# Patient Record
Sex: FEMALE | Race: BLACK OR AFRICAN AMERICAN | Employment: OTHER | URBAN - METROPOLITAN AREA
[De-identification: names, ages, dates, MRNs, and addresses within clinical notes are randomized per-mention and may not be internally consistent; named-entity substitution may affect disease eponyms.]

---

## 2018-03-05 ENCOUNTER — APPOINTMENT (OUTPATIENT)
Dept: PHYSICAL THERAPY | Age: 63
End: 2018-03-05
Payer: OTHER GOVERNMENT

## 2018-03-15 ENCOUNTER — HOSPITAL ENCOUNTER (OUTPATIENT)
Dept: PHYSICAL THERAPY | Age: 63
Discharge: HOME | End: 2018-03-15
Payer: OTHER GOVERNMENT

## 2018-03-15 PROCEDURE — 97110 THERAPEUTIC EXERCISES: CPT

## 2018-03-15 PROCEDURE — 97162 PT EVAL MOD COMPLEX 30 MIN: CPT

## 2018-03-15 PROCEDURE — 97140 MANUAL THERAPY 1/> REGIONS: CPT

## 2018-03-15 NOTE — PROGRESS NOTES
[address] [facility] AT [address], [address] [place] [address] Road  Phone: [phone]   [phone]  PLAN OF CARE / [address] [place] [address] PHYSICAL THERAPY SERVICES  Patient Name: Justin Steiner : 1955   Medical   Diagnosis: Right shoulder pain [M25.511] Treatment Diagnosis: Right shoulder pain [M25.511]   Onset Date: 2018     Referral Source: Tristian Renee MD Start of [facility]): 3/15/2018   Prior Hospitalization: See medical history Provider #: [id number]   Prior Level of Function: Independent w/ ADL's   Comorbidities: HTN, prior scapular injury   Medications: Verified on Patient Summary List   The Plan of Care and following information is based on the information from the initial evaluation.   ===========================================================================================  Assessment / key information:  Patient is a 58 y/o female presenting with chief c/o right scapular pain- onset about 6 months ago when turning and forcefully hitting shoulder on wall. Pt reported similar pain years ago which improved with therapy. Pt  Reports pain is aggravated with heavy lifting with right upper extremity and \"turning wrong\". 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 Pain ranges from 1-9/10 max briefly. Patient presents with mild/mod rounded shoulders and increased lumbar lordosis. Shoulder AROM is WFL in all planes. Thoracic rotation is painful and limited about 25% in both directions. Patient is TTP to the right mid/low trap and rhomboid muscles, costovertebral joints T5-10. Strength deficits include 4/5 shoulder ER, 4-/5 mid/low trapezius. The patient was instructed in a home exercise program to address the above findings/deficits.  The patient should benefit from therapy services to progress toward functional goals and improve quality of life.  ===========================================================================================  History MEDIUM  Complexity : 1-2 comorbidities / personal factors will impact the outcome/ POC ; Examination MEDIUM Complexity : 3 Standardized tests and measures addressing body structure, function, activity limitation and / or participation in recreation ; Presentation MEDIUM Complexity : Evolving with changing characteristics ; Decision Making MEDIUM Complexity : FOTO score of 26-74; Complexity MEDIUM  Problem List: pain affecting function, decrease ROM, decrease strength, decrease ADL/ functional abilitiies, decrease activity tolerance and decrease flexibility/ joint mobility   Treatment Plan may include any combination of the following: Therapeutic exercise, Therapeutic activities, Physical agent/modality, Manual therapy and Patient education  Patient / Family readiness to learn indicated by: asking questions, trying to perform skills and interest  Persons(s) to be included in education: patient (P)  Barriers to Learning/Limitations: None  Measures taken:    Patient Goal (s): No pain   Patient self reported health status: good  Rehabilitation Potential: good   Short Term Goals: To be accomplished in  3  weeks: Independent/compliant with long term HEP for scapular strength and thoracic mobility  Patient will be educated on sitting posture modification to reduce musculoskeletal strain with sitting ADL's   Long Term Goals:  To be accomplished in  6  weeks:  Improve FOTO outcome score by 10% to indicate a significant improvement in ADL function  Improve mid/low trap strength by 1/2 MMT grade to improve thoracic posture and shoulder function  Pt will report 50-75% improvement with heavier reaching/pushing/pulling tasks  Frequency / Duration:   Patient to be seen  1-2  time per week for 6  weeks:  Patient / Caregiver education and instruction: activity modification and exercises  G-Codes (GP): NA  Therapist Signature: Halima Garvin PT, OCS Date: 8/94/7422   Certification Period: NA Time: 1:16 PM   ===========================================================================================  I certify that the above Physical Therapy Services are being furnished while the patient is under my care. I agree with the treatment plan and certify that this therapy is necessary. Physician Signature:        Date:       Time:     Please sign and return to [facility] at [facility] UNIT or you may fax the signed copy to [phone]. Thank you.

## 2018-03-15 NOTE — PROGRESS NOTES
SHOULDER EVALUATION/ DAILY TREATMENT NOTE    Patient Name: Tran Mujica  Date:3/15/2018  : 1955  [x]  Patient  Verified  Payor:  / Plan: [facility]  RETIREES AND DEPENDENTS / Product Type: Shania Erps /    In time:1:10  Out time:2:00  Total Treatment Time (min): 50  Total Timed Codes (min): 50  1:1 Treatment Time ( [address] only):    Visit #: 1 of     Treatment Area: Right shoulder pain [M25.511]    SUBJECTIVE HISTORY:   See Plan of Care for subjective history    Pain Level (0-10 scale): 1    Posture: See POC    ROM:  [] Unable to assess at this time                                           AROM                                                              PROM   Left Right  Left Right   Flexion   Flexion     Extension   Extension     Abduction   Abduction     ER @ 0 Degrees   ER @ 0 Degrees     ER @ 90 Degrees   ER @ 90 Degrees     IR @ 90 Degrees   IR @ 90 Degrees       End Feel / Painful Arc:    Strength:   [] Unable to assess at this time                                                                            L (1-5) R (1-5) Pain   Flexors   [] Yes   [] No   Abductors   [] Yes   [] No   External Rotators   [] Yes   [] No   Internal Rotators   [] Yes   [] No   Supraspinatus   [] Yes   [] No   Serratus Anterior   [] Yes   [] No   Lower Trapezius   [] Yes   [] No   Elbow Flexion   [] Yes   [] No   Elbow Extension   [] Yes   [] No       Scapulohumoral Control / Rhythm:  Able to eccentrically lower with good control?  Left: [] Yes   [] No     Right: [] Yes   [] No      Palpation:  [] Min  [] Mod  [] Severe    Location:  [] Min  [] Mod  [] Severe    Location:  [] Min  [] Mod  [] Severe    Location:    Special Tests:  Adson's Test  [] Pos   [] Neg Yergason's Test [] Pos   [] Neg  Moraima's Test  [] Pos   [] Neg ER Lag Sign     [] Pos   [] Neg  Neer's Test  [] Pos   [] Neg IR Lift Off Sign  [] Pos   [] Neg  Hawkin's Test  [] Pos   [] Neg AC Joint  [] Pos   [] Neg  Speed's Test  [] Pos   [] Neg SC Joint  [] Pos   [] Neg  Empty Can  [] Pos   [] Neg Pectoral Tightness [] Pos   [] Neg  Anterior Apprehension [] Pos   [] Neg   Posterior Apprehension [] Pos   [] Neg  Other:     OBJECTIVE TREATMENT:  Modality (rationale):   []  E-Stim: type _ x _ min     []att   []unatt   []w/US   []w/ice   []w/heat  []  Ultrasound: []cont   []pulse    _ W/cm2 x _  min   []1MHz   []3MHz  []  Iontophoresis: []take home patch w/ dexamethazone    []40mA   []80mA                               []_ mA min w/: []dexamethazone   []other:_  []  Ice pack _  min     [] Hot pack _  min     [] Paraffin _  min  []  Other:     Therapeutic Exercise: [x] see flow sheet     [] Other:_  Added/Changed Exercises:  [x]  Added:_See HEP  to improve (function): change upper limb position for lifting, reaching and dressing tasks  []  Changed:_ to improve (function):  (minutes: 10)    Therapeutic Activity:   (minutes: )    Manual Therapy: Instrument assisted soft tissue mobilization applied to Right thoracic paraspinals and rhomboids using GT-4 and GT-3 instruments  (minutes: 8)    Other Objective/Functional Measures:   Pain Level (0-10 scale) post treatment: 1    ASSESSMENT  [x]  See Plan of Care  Patient is assigned a medium evaluation complexity based upon presence of 1-2 comorbidities, FOTO score, and changing/progressive symptom characteristics.     PLAN  See [address] Ofelia Madsen 3/15/2018  1:14 PM

## 2018-03-19 ENCOUNTER — HOSPITAL ENCOUNTER (OUTPATIENT)
Dept: PHYSICAL THERAPY | Age: 63
Discharge: HOME | End: 2018-03-19
Payer: OTHER GOVERNMENT

## 2018-03-19 PROCEDURE — 97140 MANUAL THERAPY 1/> REGIONS: CPT

## 2018-03-19 PROCEDURE — 97110 THERAPEUTIC EXERCISES: CPT

## 2018-03-30 ENCOUNTER — APPOINTMENT (OUTPATIENT)
Dept: PHYSICAL THERAPY | Age: 63
End: 2018-03-30
Payer: OTHER GOVERNMENT

## 2018-04-02 ENCOUNTER — HOSPITAL ENCOUNTER (OUTPATIENT)
Dept: PHYSICAL THERAPY | Age: 63
Discharge: HOME | End: 2018-04-02
Payer: OTHER GOVERNMENT

## 2018-04-02 PROCEDURE — 97110 THERAPEUTIC EXERCISES: CPT

## 2018-04-02 PROCEDURE — 97140 MANUAL THERAPY 1/> REGIONS: CPT

## 2018-04-02 NOTE — PROGRESS NOTES
PT DAILY TREATMENT NOTE     Patient Name: Carolee Hodges  Date:2018  : 1955  [x]  Patient  Verified  Payor:  / Plan: [facility] / Product Type:  /    In time: 158  Out time: 247  Total Treatment Time (min): 49  Total Timed Codes (min): 49  1:1 Treatment Time (min): n/a   Visit #: 3 of     Treatment Area: Right shoulder pain [M25.511]    SUBJECTIVE  Pain Level (0-10 scale): 0  Any medication changes, allergies to medications, adverse drug reactions, diagnosis change, or new procedure performed?: [x] No    [] Yes (see summary sheet for update)  Subjective functional status/changes:   [] No changes reported  \"I haven't had any pain for about a week now, but I haven't been doing any heavy stuff\". OBJECTIVE  Modality rationale:    Min Type Additional Details    [] Estim: []Att   []Unatt        []TENS instruct                  []IFC  []Premod   []NMES                     []Other:  []w/US   []w/ice   []w/heat  Position:  Location:    []  Traction: [] Cervical       []Lumbar                       [] Prone          []Supine                       []Intermittent   []Continuous Lbs:  [] before manual  [] after manual    []  Ultrasound: []Continuous   [] Pulsed                           []1MHz   []3MHz Location:  W/cm2:    []  Iontophoresis with dexamethasone         Location: [] Take home patch   [] In clinic   PD []  Ice     []  heat  []  Ice massage Position:  Location:    []  Vasopneumatic Device Pressure:       [] lo [] med [] hi   Temperature: [] lo [] med [] hi   [] Skin assessment post-treatment:  []intact []redness- no adverse reaction       []redness - adverse reaction:       34 min Therapeutic Exercise:  [x] See flow sheet :   Rationale: increase ROM and increase strength to improve the patients ability to lift, reach and carry for ADL's. 15 min Manual Therapy:  DTM b/l t/s paraspinals and periscapular mm's.   Manual scapulo-thoracic joint mobs sup/inf, ant/post, upward/downward ROT R shoulder in L S/L   Rationale: decrease pain, increase ROM, increase tissue extensibility and decrease trigger points to lift, reach and carry for ADL's.       min Patient Education: [x] Review HEP    [] Progressed/Changed HEP based on:   [] positioning   [] body mechanics   [] transfers   [] heat/ice application        Other Objective/Functional Measures:   -manual cues required for form with quadruped t/s ROT  -verbal cues for form with cat stretch, prone scap stability    Pain Level (0-10 scale) post treatment: 0    ASSESSMENT/Changes in Function: Pt with limited thoracic mobility during exercises requiring cues for improved form/technique. Ongoing hypertonicity noted to b/l thoracic paraspinals and periscapular mm's addressed with manual. Progression of exercises indicating to promote return to full work activities. Patient will continue to benefit from skilled PT services to modify and progress therapeutic interventions, address functional mobility deficits, address ROM deficits, address strength deficits, analyze and address soft tissue restrictions and analyze and cue movement patterns to attain remaining goals. []  See Plan of Care  []  See progress note/recertification  []  See Discharge Summary         Progress towards goals / Updated goals:  Goals continue to be in progress. Good progress towards pain goal    PLAN  []  Upgrade activities as tolerated     [x]  Continue plan of care  []  Update interventions per flow sheet       []  Discharge due to:_  []  Other:_      Karla Jurado, PT 4/2/2018  2:02 PM

## 2018-06-18 NOTE — PROGRESS NOTES
[address] [facility] AT [address], [address] [place] [address] Road  Phone: [phone]   [phone]  DISCHARGE SUMMARY  Patient Name: Helena Tyson : 1955   Treatment/Medical Diagnosis: Right shoulder pain [M25.511]   Referral Source: Celine Capps MD     Date of Initial Visit: 3/15/18 Attended Visits: 3 Missed Visits: 1     SUMMARY OF TREATMENT  Therapeutic exercise to right scapular/T-spine region, manual therapy, pain modalities, HEP  CURRENT STATUS  Patient attended only 3 of 6-12 prescribed therapy visits for right scapular pain. Patient reported 0/10 pain on her last visit on 18 and said it had been 1 week since having any pain. Pt did not return after her 3rd visit and has been discharged without our ability to formally assess progress toward LTG's. RECOMMENDATIONS  Discontinue therapy due to lack of attendance or compliance. If you have any questions/comments please contact us directly at [phone]. Thank you for allowing us to assist in the care of your patient.     Therapist Signature: Jovi Fletcher PT, OCS Date: 18     Time: 1:33 PM

## 2021-06-08 ENCOUNTER — HOSPITAL ENCOUNTER (OUTPATIENT)
Dept: PHYSICAL THERAPY | Age: 66
Discharge: HOME | End: 2021-06-08
Payer: MEDICARE

## 2021-06-08 PROCEDURE — 97110 THERAPEUTIC EXERCISES: CPT

## 2021-06-08 PROCEDURE — 97162 PT EVAL MOD COMPLEX 30 MIN: CPT

## 2021-06-08 NOTE — PROGRESS NOTES
[address] [facility] AT [address], [address] [facility] [address]  Phone: [phone]   [phone]  PLAN OF CARE / [address] [place] [address] PHYSICAL THERAPY SERVICES  Patient Name: Moira Nguyen : 1955   Medical   Diagnosis: Lower back pain [M54.5] Treatment Diagnosis: Lower back pain [M54.5]   Onset Date: ~1 year  S/p L1 kyphoplasty 21     Referral Source: Rosanna Gómez MD [facility] [place]): 2021   Prior Hospitalization: See medical history Provider #: [id number]   Prior Level of Function: prior to onset no limitations to ADL's/mobility. For the past year, pt has been unable to perform bed mobility/rolling, LE's buckle when getting OOB. 2 story home with 1st floor set up. Lives with spouse   Comorbidities: Arthritis, HTN   Medications: Verified on Patient Summary List   The Plan of Care and following information is based on the information from the initial evaluation.   ===========================================================================================  Assessment / key information:  Pt is a 77y.o. year old female with subjective complaints of chronic LBP stemming from a fall and L1 compression fracture ~ 1 year ago. Pt had 1 year of progressive decline in functional mobility 2/2 LBP and LE's buckling when getting OOB. Pt ultimately underwent L1 kyphoplasty DOS 21 and reports good improvement to low back pain; sx's now primarily described as soreness to low back. \"I have good days and bad days\". Denies paresthesias, bowel/bladder incontinence. States pain is worse with cough/sneeze. Current pain is rated as 5 to 10/10; localized across low back, though R>L.   Current functional limitations: supine to sit (getting out of high bed by rolling to prone and backing out onto LE's with UE support), sitting tolerance ~ 1 hour, standing tolerance ~30 min, walk ~30 min in grocery store with shopping cart, stairs (fatigue with 1 flight). FOTO score= 51/100 indicating 49% impairment to functional activities. Today's evaulation is significant for   POSTURE/OBSERVATION: flattened cervico-thoracic-lumbar curvatures. FUNCTIONAL ASSESSMENT: Standing squat to ~55 with upright posture, no hip hinge, decreased DF ROM. Ascends stairs reciprocally; descends reciprocally with 2 UE support. Bed mobility WNL. Sit to/from stand requires UE assist.     ROM LUMBAR: flex WNL \"feels good\"; ext 25% \"pressure\"; Lat flex b/l 75% w/ c/o \"pressure\"; ROT b/l 50% with c/o \"pressure\"; Hips WNL   STRENGTH  Hip flex R 3+/5, L 4-/5; abd R 4/5, L 4/5; Knee Ext R 5/5, L 5/5; flex R 5-/5, L 5/5; Ankle DF R 5/5, L 5/5. Poor isometric TA. Bridge to <25% with c/o \"sore\" low back. SPECIAL TESTS: (-) SLR, 90-90 hamstrings. .    ADDITIONAL FINDINGS: Significant hypertonicity to R>L lumbar paraspinals, QL. Pt with old scar vertically to lower abdomen, umbilicus to pubic bone; states from [age] y.o. appendectomy; pt c/o \"burning\" pain to this incision over the last couple days. (-) diastasis recti)  Mild tightness to b/l quads/hip flexors    These findings are supportive for diagnosis of lumbago with decreased core stability. Pt will be a good candidate for skilled PT to address these impairments and promote return to normal ADLs and functional mobility for improved quality of life.    ===========================================================================================  Eval Complexity: History MEDIUM  Complexity : 1-2 comorbidities / personal factors will impact the outcome/ POC ;  Examination  MEDIUM Complexity : 3 Standardized tests and measures addressing body structure, function, activity limitation and / or participation in recreation ; Presentation MEDIUM Complexity : Evolving with changing characteristics ;   Decision Making MEDIUM Complexity : FOTO score of 26-74; Overall Complexity MEDIUM  Problem List: pain affecting function, decrease ROM, decrease strength, impaired gait/ balance, decrease ADL/ functional abilitiies, decrease activity tolerance, decrease flexibility/ joint mobility and decrease transfer abilities   Treatment Plan may include any combination of the following: Therapeutic exercise, Therapeutic activities, Neuromuscular re-education, Physical agent/modality, Gait/balance training, Manual therapy, Patient education, Self Care training, Functional mobility training, Home safety training and Stair training  Patient / Family readiness to learn indicated by: asking questions, trying to perform skills and interest  Persons(s) to be included in education: patient (P)  Barriers to Learning/Limitations: None  Measures taken, if barriers to learning:    Patient Goal (s): \"movement within lower back\"   Patient self reported health status: good  Rehabilitation Potential: good   Short Term Goals: To be accomplished in  4  treatments:  1. Pt will be educated in/compliant with appropriate HEP to decrease pain, increase ROM, increase strength and return pt to PLOF. 2. Pt will demonstrate Fair isometric TA for increased axial stability with ADL's.  3. Pt will note pain less than or equal to 5/10 at worst to allow increase in functional abilities.  Long Term Goals: To be accomplished in  4-6  weeks:  1. Pt will improve FOTO score to >/= 60 to demo a significant improvement in functional activity tolerance. 2. Pt will increase bridge to at least 50% for to promote increased ease with stair negotiation in home. 3. Pt will demonstrate b/l SLS >/= 20\" for increased ambulation tolerance for community mobility. Frequency / Duration:   Patient to be seen  2  times per week for 4-6  weeks:  Patient / Caregiver education and instruction: self care, activity modification and exercises  Therapist Signature: Justice Jurado PT Date: 8/5/9265   Certification Period: 06/08/21 to 9/6/2021 Time: 11:53 AM ===========================================================================================  I certify that the above Physical Therapy Services are being furnished while the patient is under my care. I agree with the treatment plan and certify that this therapy is necessary. Physician Signature:        Date:       Time:        Mina Galvez MD  Please sign and return to [facility] AT [place] [facility] at [facility] UNIT or you may fax the signed copy to [phone]. Thank you.

## 2021-06-08 NOTE — PROGRESS NOTES
PHYSICAL THERAPY - DAILY TREATMENT NOTE    Patient Name: Nina George        Date: 2021  : 1955   yes Patient  Verified  Visit #:   1     Insurance: Payor: [employer] / Plan: [employer] MEDICARE PART A & B / Product Type: Medicare /      In time: 2:25 PM Out time: 3:16 PM   Total Treatment Time: 51     Medicare/BCBS Time Tracking (below)   Total Timed Codes (min):  51 1:1 Treatment Time:  51     TREATMENT AREA =  Lower back pain [M54.5]    SUBJECTIVE  Pain Level (on 0 to 10 scale):  5  / 10   Medication Changes/New allergies or changes in medical history, any new surgeries or procedures?    no  If yes, update Summary List   Subjective Functional Status/Changes:  []  No changes reported     See POC          OBJECTIVE    See exam on chart for details on objective findings        41 min Therapeutic Exercise:  [x]  See flow sheet   Rationale:      increase ROM and increase strength to improve the patients ability to perform functional mobility         Billed With/As:   [x] TE   [] TA   [] Neuro   [] Self Care Patient Education: [x] Review HEP    [] Progressed/Changed HEP based on:   [] positioning   [] body mechanics   [] transfers   [] heat/ice application    [] other:      Other Objective/Functional Measures:    See POC     Post Treatment Pain Level (on 0 to 10) scale:   0  / 10     ASSESSMENT  Assessment/Changes in Function:     See POC     []  See Progress Note/Recertification   Patient will continue to benefit from skilled PT services to modify and progress therapeutic interventions, address functional mobility deficits, address ROM deficits, address strength deficits, analyze and address soft tissue restrictions, analyze and cue movement patterns, analyze and modify body mechanics/ergonomics, assess and modify postural abnormalities and instruct in home and community integration to attain remaining goals.    Progress toward goals / Updated goals:    See POC     PLAN  []  Upgrade activities as tolerated yes Continue plan of care   []  Discharge due to :    []  Other:      Therapist: Nicole Kramer.  Gabbie Allen, PT    Date: 6/8/2021 Time: 11:53 AM     Future Appointments   Date Time Provider Smita Pedro   6/15/2021  5:15 PM Patrick Lorenzo PT [facility]   6/22/2021  1:15 PM Ladi Jiang [facility]   6/24/2021  8:30 AM Sera MIKE [facility]   6/29/2021 11:30 AM [facility] PT [facility] [place] 2 [facility]   7/1/2021 11:00 AM [facility] PT [place] [place] 2 [facility]

## 2021-06-15 ENCOUNTER — HOSPITAL ENCOUNTER (OUTPATIENT)
Dept: PHYSICAL THERAPY | Age: 66
Discharge: HOME | End: 2021-06-15
Payer: MEDICARE

## 2021-06-15 PROCEDURE — 97110 THERAPEUTIC EXERCISES: CPT

## 2021-06-15 PROCEDURE — 97140 MANUAL THERAPY 1/> REGIONS: CPT

## 2021-06-15 NOTE — PROGRESS NOTES
PHYSICAL THERAPY - DAILY TREATMENT NOTE    Patient Name: Amber Carson        Date: 6/15/2021  : 1955   yes Patient  Verified  Visit #:   2   of     Insurance: Payor: Melvi Hind / Plan: [place] MED[place] PART [place] & B / Product Type: Medicare /      In time: 5:04 PM Out time: 6:03   Total Treatment Time: 59     Medicare/BCBS Time Tracking (below)   Total Timed Codes (min):  44 1:1 Treatment Time:  44     TREATMENT AREA =  Lower back pain [M54.5]    SUBJECTIVE  Pain Level (on 0 to 10 scale):  4  / 10   Medication Changes/New allergies or changes in medical history, any new surgeries or procedures?    no  If yes, update Summary List   Subjective Functional Status/Changes:  []  No changes reported     Pt states she hasn't had a lot of time for her HEP 2/2 was out of town visiting family over the weekend. States she did ok driving up and back to [facility]; \"I took pillows just in case\". States she already did a lot of housework today.        OBJECTIVE  Modality rationale: decrease pain and increase tissue extensibility to improve the patients ability to change/maintain body position   Min Type Additional Details    [] Estim:  []Unatt       []IFC  []Premod                        []Other:  []w/ice   []w/heat  Position:  Location:    [] Estim: []Att    []TENS instruct  []NMES                    []Other:  []w/US   []w/ice   []w/heat  Position:  Location:    []  Traction: [] Cervical       []Lumbar                       [] Prone          []Supine                       []Intermittent   []Continuous Lbs:  [] before manual  [] after manual    []  Ultrasound: []Continuous   [] Pulsed                           []1MHz   []3MHz Location:  W/cm2:    []  Iontophoresis with dexamethasone         Location: [] Take home patch   [] In clinic   10 []  Ice     [x]  heat   Position: prone with FR to ankles  Location: lumbar    []  Laser with stim  []  Other: Position:  Location:    []  Vasopneumatic Device  Pre-treatment girth:  Post-treatment girth:  Measured at (location):  Pressure:       [] lo [] med [] hi   Temperature: [] lo [] med [] hi   [] Skin assessment post-treatment:  []intact []redness- no adverse reaction    []redness  adverse reaction:     34 min Therapeutic Exercise:  [x]  See flow sheet (-4 min UBE)   Rationale:      increase ROM and increase strength to improve the patients ability to perform functional mobility       10 min Manual Therapy:  STM to R>L lumbar paraspinals and QL   The manual therapy interventions were performed at a separate and distinct time from the therapeutic activities interventions. Rationale: decrease pain, increase ROM, increase tissue extensibility and decrease trigger points to change/maintain body position      Billed With/As:   [x] TE   [] TA   [] Neuro   [] Self Care Patient Education: [x] Review HEP    [] Progressed/Changed HEP based on:   [] positioning   [] body mechanics   [] transfers   [] heat/ice application    [] other:      Other Objective/Functional Measures:  -new exercises added as per flow sheet with vc's provided for form/technique 100% of the time. -reports \"good stretch\" with 3 way seated SB stretch  -unable to clear hips from table with bridge 2/2 pain c/o on 1st attempts; able to achieve ~1 inch clearance on try 3, but loss of PPT     Post Treatment Pain Level (on 0 to 10) scale:   5-6  / 10     ASSESSMENT  Assessment/Changes in Function:     Pt requires heavy vc's and mc's for form/technique with A/PPT; heavy vc's required with TA iso. Pt encouraged to continue compliance with HEP to promote improved gains with ADL tolerance.       []  See Progress Note/Recertification   Patient will continue to benefit from skilled PT services to modify and progress therapeutic interventions, address functional mobility deficits, address ROM deficits, address strength deficits, analyze and address soft tissue restrictions, analyze and cue movement patterns, analyze and modify body mechanics/ergonomics, assess and modify postural abnormalities and instruct in home and community integration to attain remaining goals. Progress toward goals / Updated goals: · Short Term Goals: To be accomplished in  4  treatments:  1. Pt will be educated in/compliant with appropriate HEP to decrease pain, increase ROM, increase strength and return pt to PLOF.    2. Pt will demonstrate Fair isometric TA for increased axial stability with ADL's.-6/15: Fair- with supervision and heavy vc's. 3. Pt will note pain less than or equal to 5/10 at worst to allow increase in functional abilities.      · Long Term Goals: To be accomplished in  4-6  weeks:  1. Pt will improve FOTO score to >/= 60 to demo a significant improvement in functional activity tolerance. 2. Pt will increase bridge to at least 50% for to promote increased ease with stair negotiation in home. 3. Pt will demonstrate b/l SLS >/= 20\" for increased ambulation tolerance for community mobility.        PLAN  []  Upgrade activities as tolerated yes Continue plan of care   []  Discharge due to :    []  Other:      Therapist: Lashanda Calhoun.  Dhaval Barcenas, PT    Date: 6/15/2021 Time: 6:05 PM      Future Appointments   Date Time Provider Smita Pedro   6/15/2021  5:15 PM Marie Ulloa, PT [facility]   6/22/2021  1:15 PM Gale Jose [facility]   6/24/2021  8:30 AM Nadine Carcamo A [facility]   6/29/2021 11:30 AM [facility] PT CHILLED POND 2 [facility]   7/1/2021 11:00 AM [facility] PT CHILLED POND 2 [facility]

## 2021-06-21 ENCOUNTER — HOSPITAL ENCOUNTER (OUTPATIENT)
Dept: PHYSICAL THERAPY | Age: 66
Discharge: HOME | End: 2021-06-21
Payer: MEDICARE

## 2021-06-21 PROCEDURE — 97110 THERAPEUTIC EXERCISES: CPT

## 2021-06-21 NOTE — PROGRESS NOTES
PHYSICAL THERAPY - DAILY TREATMENT NOTE    Patient Name: Le Saint        Date: 2021  : 1955   yes Patient  Verified  Visit #:   3   of     Insurance: Payor: Benson Lanier / Plan: [place] MEDICARE PART A & B / Product Type: Medicare /      In time: 10:46 Out time: 11:26   Total Treatment Time: 40     Medicare/BCBS Time Tracking (below)   Total Timed Codes (min):  40 1:1 Treatment Time:  40     TREATMENT AREA =  Lower back pain [M54.5]    SUBJECTIVE  Pain Level (on 0 to 10 scale):  0  / 10   Medication Changes/New allergies or changes in medical history, any new surgeries or procedures?    no  If yes, update Summary List   Subjective Functional Status/Changes:  []  No changes reported     Pt reports she has been feeling better since her last session. Drove to/from [place] this weekend without any pain. Reports she has not had time to do her HEP.            OBJECTIVE  Modalities Rationale:     decrease pain and increase tissue extensibility to improve patient's ability to complete ADLs   min [] Estim, type/location:                                      []  att     []  unatt     []  w/US     []  w/ice    []  w/heat    min []  Mechanical Traction: type/lbs                   []  pro   []  sup   []  int   []  cont    []  before manual    []  after manual    min []  Ultrasound, settings/location:      min []  Iontophoresis w/ dexamethasone, location:                                               []  take home patch       []  in clinic   10 min []  Ice     [x]  Heat    location/position: Prone with FR to ankles    min []  Vasopneumatic Device, press/temp:    If using vaso (only need to measure limb vaso being performed on)      pre-treatment girth :       post-treatment girth :       measured at (landmark location) :      min []  Other:    [] Skin assessment post-treatment (if applicable):    []  intact    []  redness- no adverse reaction                  []redness  adverse reaction:      40 min Therapeutic Exercise:  [x]  See flow sheet   Rationale:      increase ROM and increase strength to improve the patients ability to complete ADLs       Billed With/As:   [x] TE   [] TA   [] Neuro   [] Self Care Patient Education: [x] Review HEP    [] Progressed/Changed HEP based on:   [] positioning   [] body mechanics   [] transfers   [] heat/ice application    [] other:      Other Objective/Functional Measures:    Minimal lumbopelvic disassociation noted w/ SB rocks  Added SB marches to improve dynamic core stability  Inc L knee pain TG squats; advised to reduce ROM  Lumbar ext/rot compensation throughout 1/2 prone donkey kicks  Supine bridge to 100% AROM     Post Treatment Pain Level (on 0 to 10) scale:   0  / 10     ASSESSMENT  Assessment/Changes in Function:     Heavy manual cues for A/PPT seated on SB'; improved when laying supine on mat table. Denied pain with supine bridge and able to achieve > ROM compared to prior sessions. Lengthy discussion/education given re: importance of compliance to HEP in order to progress in PT. Pt verbalized understanding. Withheld manual therapy this visit 2' pt reporting 0/10 pain throughout and after TE. Declined modalities. []  See Progress Note/Recertification   Patient will continue to benefit from skilled PT services to modify and progress therapeutic interventions, address functional mobility deficits, address ROM deficits, address strength deficits, analyze and address soft tissue restrictions, analyze and cue movement patterns, analyze and modify body mechanics/ergonomics, assess and modify postural abnormalities and instruct in home and community integration to attain remaining goals. Progress toward goals / Updated goals: · Short Term Goals: To be accomplished in  4  treatments:  1.  Pt will be educated in/compliant with appropriate HEP to decrease pain, increase ROM, increase strength and return pt to PLOF.  6/21/21\" goal not met, pt admits to non-compliance  2.  Pt will demonstrate Fair isometric TA for increased axial stability with ADL's.-6/15: Fair- with supervision and heavy vc's. 3. Pt will note pain less than or equal to 5/10 at worst to allow increase in functional abilities. 6/21/21: goal in progress, reports 0/10 pain over the last 3 days in her l/s     · Long Term Goals: To be accomplished in  4-6  weeks:  1. Pt will improve FOTO score to >/= 60 to demo a significant improvement in functional activity tolerance. 2. Pt will increase bridge to at least 50% for to promote increased ease with stair negotiation in home. 3. Pt will demonstrate b/l SLS >/= 20\" for increased ambulation tolerance for community mobility.        PLAN  []  Upgrade activities as tolerated yes Continue plan of care   []  Discharge due to :    []  Other:      Therapist: Kyra Tolentino, PT    Date: 6/21/2021 Time: 9:51 AM     Future Appointments   Date Time Provider Smita Pedro   6/21/2021 10:45 AM Jeannie Mayfield, PT [facility]   6/24/2021  8:30 AM Balaji Wu [facility]   6/29/2021 11:30 AM [facility] PT CHILLED POND 2 [facility]   7/1/2021 11:00 AM [facility] PT CHILLED POND 2 [facility]

## 2021-06-22 ENCOUNTER — APPOINTMENT (OUTPATIENT)
Dept: PHYSICAL THERAPY | Age: 66
End: 2021-06-22
Payer: MEDICARE

## 2021-06-24 ENCOUNTER — HOSPITAL ENCOUNTER (OUTPATIENT)
Dept: PHYSICAL THERAPY | Age: 66
Discharge: HOME | End: 2021-06-24
Payer: MEDICARE

## 2021-06-24 PROCEDURE — 97110 THERAPEUTIC EXERCISES: CPT

## 2021-06-24 NOTE — PROGRESS NOTES
PHYSICAL THERAPY - DAILY TREATMENT NOTE    Patient Name: Manoj Guy        Date: 2021  : 1955   yes Patient  Verified  Visit #:   4   of     Insurance: Payor: Hanna Moore / Plan: [place] MEDICARE PART A & B / Product Type: Medicare /      In time: 8:30 Out time: 9:15   Total Treatment Time: 45     Medicare/BCBS Time Tracking (below)   Total Timed Codes (min):  45 1:1 Treatment Time:  38     TREATMENT AREA =  Lower back pain [M54.5]    SUBJECTIVE  Pain Level (on 0 to 10 scale):  2-3  / 10 right SIJ   Medication Changes/New allergies or changes in medical history, any new surgeries or procedures?    no  If yes, update Summary List   Subjective Functional Status/Changes:  []  No changes reported   Patient reports since she has been healing the pain in mostly on the right side, pointing to right SIJ.      OBJECTIVE  Modalities Rationale:     decrease pain and increase tissue extensibility to improve patient's ability to complete ADLs   min [] Estim, type/location:                                      []  att     []  unatt     []  w/US     []  w/ice    []  w/heat    min []  Mechanical Traction: type/lbs                   []  pro   []  sup   []  int   []  cont    []  before manual    []  after manual    min []  Ultrasound, settings/location:      min []  Iontophoresis w/ dexamethasone, location:                                               []  take home patch       []  in clinic   PD min []  Ice     [x]  Heat    location/position: Prone with FR to ankles    min []  Vasopneumatic Device, press/temp:    If using vaso (only need to measure limb vaso being performed on)      pre-treatment girth :       post-treatment girth :       measured at (landmark location) :      min []  Other:    [x] Skin assessment post-treatment (if applicable):    [x]  intact    []  redness- no adverse reaction                  []redness  adverse reaction:      45/38 min Therapeutic Exercise:  [x]  See flow sheet:   Rationale: increase ROM and increase strength to improve the patients ability to complete ADLs       Billed With/As:   [x] TE   [] TA   [] Neuro   [] Self Care Patient Education: [x] Review HEP    [] Progressed/Changed HEP based on:   [] positioning   [] body mechanics   [] transfers   [] heat/ice application    [] other:      Other Objective/Functional Measures:   - added Paloff Press with dbl OTB  - added BKFO  - continues to be challenged with SB rocks, demo and max cuing     Post Treatment Pain Level (on 0 to 10) scale:   2  / 10     ASSESSMENT  Assessment/Changes in Function:   Patient continues to require max demo and cuing for proper APT/PPT with improved form as she progresses through reps. She tolerated new therex well without increase in pain. []  See Progress Note/Recertification   Patient will continue to benefit from skilled PT services to modify and progress therapeutic interventions, address functional mobility deficits, address ROM deficits, address strength deficits, analyze and address soft tissue restrictions, analyze and cue movement patterns, analyze and modify body mechanics/ergonomics, assess and modify postural abnormalities and instruct in home and community integration to attain remaining goals. Progress toward goals / Updated goals: · Short Term Goals: To be accomplished in  4  treatments:  1.  Pt will be educated in/compliant with appropriate HEP to decrease pain, increase ROM, increase strength and return pt to PLOF.  6/21/21\" goal not met, pt admits to non-compliance  2. Pt will demonstrate Fair isometric TA for increased axial stability with ADL's.-6/15: Fair- with supervision and heavy vc's. 3. Pt will note pain less than or equal to 5/10 at worst to allow increase in functional abilities. 6/21/21: goal in progress, reports 0/10 pain over the last 3 days in her l/s     · Long Term Goals: To be accomplished in  4-6  weeks:  1.  Pt will improve FOTO score to >/= 60 to demo a significant improvement in functional activity tolerance. 2. Pt will increase bridge to at least 50% for to promote increased ease with stair negotiation in home. 3. Pt will demonstrate b/l SLS >/= 20\" for increased ambulation tolerance for community mobility.        PLAN  []  Upgrade activities as tolerated yes Continue plan of care   []  Discharge due to :    []  Other:      Therapist: GEORGE Meneses    Date: 6/24/2021 Time: 9:15 AM     Future Appointments   Date Time Provider Smita Pedro   6/29/2021 11:30 AM [facility] PT CHILLED POND 2 [facility]   7/1/2021 11:00 AM [facility] PT CHILLED POND 2 [facility]

## 2021-06-29 ENCOUNTER — HOSPITAL ENCOUNTER (OUTPATIENT)
Dept: PHYSICAL THERAPY | Age: 66
Discharge: HOME | End: 2021-06-29
Payer: MEDICARE

## 2021-06-29 PROCEDURE — 97140 MANUAL THERAPY 1/> REGIONS: CPT

## 2021-06-29 PROCEDURE — 97110 THERAPEUTIC EXERCISES: CPT

## 2021-06-29 NOTE — PROGRESS NOTES
[address] [facility] PHYSICAL THERAPY  [address], [place], [address] - Phone: [phone]  Fax: [phone]  Request for use of Dry Needling/Intramuscular Manual Therapy  Patient Name: Kobe Boswell : 1955   Treatment/Medical Diagnosis: Lower back pain [M54.5]   Referral Source: Luzma Altamirano MD     Date of Initial Visit: 21 Attended Visits: 5 Missed Visits: 0     Based on findings from the physical therapy examination and evaluation, the evaluating therapist believes the patient, Kobe Boswell would benefit from including Dry Needling as part of the plan of care. Dry needling is a treatment technique utilized in conjunction with other PT interventions to inactivate myofascial trigger points and the pain and dysfunction they cause. Dry Needling is an advanced procedure that requires additional training of intensive course work. PROCEDURE:          -  Solid filament sterile needle (typically 0.3mm/30 gauge) inserted into a trigger point          -  Repeated movements inactivate the trigger points, taking 30-60 seconds per site  Typically consists of 1 dry needling session per week and a possible second treatment including muscle re-education, flexibility, strengthening and other manual techniques to facilitate the benefits of dry needling  BENEFITS:   Inactivation of trigger points   Decreased pain   Increased muscle length   Improved movement patterns   Restoration of function POTENTIAL RISKS:   Post-needling soreness   Infection   Bruising/bleeding   Penetration of a nerve   Pneumothorax  All treating PTs have been thoroughly educated in avoiding adverse reactions   If you agree with this recommendation, please sign the attached prescription form and fax it to us at [phone]. If you have questions or concerns regarding dry needling or any other treatment we may be providing, please contact us at [phone].   Thank you for allowing us to assist in the care of your patient. Therapist Signature: Castro Pratt Date: 6/29/2021     Time: 2:22 PM   NOTE TO PHYSICIAN:  PLEASE COMPLETE THE ORDERS BELOW AND FAX TO   [facility]: [phone]  If you are unable to process this request in 24 hours please contact our office: [phone]  Check box     I have read the above request and AGREE to the recommendation of including dry needling as part of the plan of care. I have read the above request and DO NOT AGREE to including dry needling as part of the plan of care.     I have read the above report and request that my patient continue therapy with the following changes/special instructions: _________________________________________________     Physician Signature:        Date:       Time:       Breanne Biswas MD

## 2021-06-29 NOTE — PROGRESS NOTES
PHYSICAL THERAPY - DAILY TREATMENT NOTE    Patient Name: Miranda Garcia        Date: 2021  : 1955   yes Patient  Verified  Visit #:   5     Insurance: Payor: Joe Dorsey / Plan: [place] MEDICARE PART A & B / Product Type: Medicare /      In time: 215p Out time: 301   Total Treatment Time: 46     Medicare/BCBS Time Tracking (below)   Total Timed Codes (min):  46 1:1 Treatment Time:  46     TREATMENT AREA =  Lower back pain [M54.5]    SUBJECTIVE  Pain Level (on 0 to 10 scale):  3-4  / 10    Medication Changes/New allergies or changes in medical history, any new surgeries or procedures?    no  If yes, update Summary List   Subjective Functional Status/Changes:  []  No changes reported   Patient reports she saw her MD who recommended acupuncture for the pain she has been having in back     OBJECTIVE  Modalities Rationale:     decrease pain and increase tissue extensibility to improve patient's ability to complete ADLs   min [] Estim, type/location:                                      []  att     []  unatt     []  w/US     []  w/ice    []  w/heat    min []  Mechanical Traction: type/lbs                   []  pro   []  sup   []  int   []  cont    []  before manual    []  after manual    min []  Ultrasound, settings/location:      min []  Iontophoresis w/ dexamethasone, location:                                               []  take home patch       []  in clinic   PD min []  Ice     [x]  Heat    location/position: Prone with FR to ankles    min []  Vasopneumatic Device, press/temp:    If using vaso (only need to measure limb vaso being performed on)      pre-treatment girth :       post-treatment girth :       measured at (landmark location) :      min []  Other:    [x] Skin assessment post-treatment (if applicable):    [x]  intact    []  redness- no adverse reaction                  []redness  adverse reaction:      36 min Therapeutic Exercise:  [x]  See flow sheet:   Rationale:      increase ROM and increase strength to improve the patients ability to complete ADLs       10 min Manual Therapy:   stm to R lumbar quadrant, R TFL glute med, and piriformis in L sidelying with pillow between knees   The manual therapy interventions were performed at a separate and distinct time from the therapeutic activities interventions. Rationale: decrease pain and increase tissue extensibility to improve pt tolerance for ADLs    Billed With/As:   [x] TE   [] TA   [] Neuro   [] Self Care Patient Education: [x] Review HEP    [] Progressed/Changed HEP based on:   [] positioning   [] body mechanics   [] transfers   [] heat/ice application    [] other:      Other Objective/Functional Measures:   Continued with progressions from last visit       Post Treatment Pain Level (on 0 to 10) scale:   3-4 / 10     ASSESSMENT  Assessment/Changes in Function:   Pt required max vc, demo, and tc to perform anterior and posterior pelvic tilt on stability ball without substituting knee flex/ext. Pt edu re differences between dry needling and acupuncture, and benefits and purpose of tx. Pt verbalized interest in dry needling tx, request was sent to Dr. Mariana Casper per pt request today 6/29/21. Pt was challenged with 1/2 prone donkey kicks reporting mm fatigue with the exercise. Pt responded well to manual therapy reporting no inc in sx with tx.      []  See Progress Note/Recertification   Patient will continue to benefit from skilled PT services to modify and progress therapeutic interventions, address functional mobility deficits, address ROM deficits, address strength deficits, analyze and address soft tissue restrictions, analyze and cue movement patterns, analyze and modify body mechanics/ergonomics, assess and modify postural abnormalities and instruct in home and community integration to attain remaining goals. Progress toward goals / Updated goals: · Short Term Goals:  To be accomplished in  4  treatments:  1.  Pt will be educated in/compliant with appropriate HEP to decrease pain, increase ROM, increase strength and return pt to PLOF.  6/21/21\" goal not met, pt admits to non-compliance  2. Pt will demonstrate Fair isometric TA for increased axial stability with ADL's.-6/15: Fair- with supervision and heavy vc's. 3. Pt will note pain less than or equal to 5/10 at worst to allow increase in functional abilities. 6/21/21: goal in progress, reports 0/10 pain over the last 3 days in her l/s     · Long Term Goals: To be accomplished in  4-6  weeks:  1. Pt will improve FOTO score to >/= 60 to demo a significant improvement in functional activity tolerance. 2. Pt will increase bridge to at least 50% for to promote increased ease with stair negotiation in home. 3. Pt will demonstrate b/l SLS >/= 20\" for increased ambulation tolerance for community mobility.        PLAN  []  Upgrade activities as tolerated yes Continue plan of care   []  Discharge due to :    []  Other:      Therapist: Glee Spatz    Date: 6/29/2021 Time: 9:15 AM     Future Appointments   Date Time Provider Smita Pedro   6/29/2021  2:15 PM Marcelino Tolentino [facility]   7/1/2021 11:00 AM [facility] PT CHILLED POND [address]

## 2021-07-01 ENCOUNTER — HOSPITAL ENCOUNTER (OUTPATIENT)
Dept: PHYSICAL THERAPY | Age: 66
Discharge: HOME | End: 2021-07-01
Payer: MEDICARE

## 2021-07-01 PROCEDURE — 97110 THERAPEUTIC EXERCISES: CPT

## 2021-07-01 PROCEDURE — 97140 MANUAL THERAPY 1/> REGIONS: CPT

## 2021-07-01 NOTE — PROGRESS NOTES
PHYSICAL THERAPY - DAILY TREATMENT NOTE    Patient Name: Teresa Burleson        Date: 2021  : 1955   yes Patient  Verified  Visit #:     Insurance: Payor: Mona Lua / Plan: [place] MED[place] PART [place] & B / Product Type: Medicare /      In time: 11:13 Out time: 12:08   Total Treatment Time: 55     Medicare/BCBS Time Tracking (below)   Total Timed Codes (min):  43 1:1 Treatment Time:  43     TREATMENT AREA =  Lower back pain [M54.5]    SUBJECTIVE  Pain Level (on 0 to 10 scale):  4  / 10    Medication Changes/New allergies or changes in medical history, any new surgeries or procedures?    no  If yes, update Summary List   Subjective Functional Status/Changes:  []  No changes reported   \"I'm going to call the MD office\" \"yesterday morning pain shot up my back\" \"[place] went up my back since then I've been sore\"     OBJECTIVE  Modalities Rationale:     decrease pain and increase tissue extensibility to improve patient's ability to complete ADLs   min [] Estim, type/location:                                      []  att     []  unatt     []  w/US     []  w/ice    []  w/heat    min []  Mechanical Traction: type/lbs                   []  pro   []  sup   []  int   []  cont    []  before manual    []  after manual    min []  Ultrasound, settings/location:      min []  Iontophoresis w/ dexamethasone, location:                                               []  take home patch       []  in clinic   10' + 2' set up min []  Ice     [x]  Heat    location/position: Supine to low back with appropriate layering     min []  Vasopneumatic Device, press/temp:    If using vaso (only need to measure limb vaso being performed on)      pre-treatment girth :       post-treatment girth :       measured at (landmark location) :      min []  Other:    [x] Skin assessment post-treatment (if applicable):    [x]  intact    []  redness- no adverse reaction                  []redness  adverse reaction:      33 min Therapeutic Exercise:  [x]  See flow sheet:   Rationale:      increase ROM and increase strength to improve the patients ability to complete ADLs       10 min Manual Therapy:   STM to Right lumbar paraspinals, Right TFL, right glute med, and piriformis in Left sidelying with pillow between knees   The manual therapy interventions were performed at a separate and distinct time from the therapeutic activities interventions. Rationale: decrease pain and increase tissue extensibility to improve pt tolerance for ADLs    Billed With/As:   [x] TE   [] TA   [] Neuro   [] Self Care Patient Education: [x] Review HEP    [] Progressed/Changed HEP based on:   [] positioning   [] body mechanics   [] transfers   [] heat/ice application    [] other:      Other Objective/Functional Measures:   therex performed as per flow sheet   Post Treatment Pain Level (on 0 to 10) scale:  3 / 10     ASSESSMENT  Assessment/Changes in Function:   Pt with positive response to PT session as she reports decreased pain levels post manual therapy interventions and modalities. Skilled verbal cues provided to avoid Valsalva during bridges and to perform in pain free range. Pt reports improved compliance with HEP as she has performed HEP ~3x per week. []  See Progress Note/Recertification   Patient will continue to benefit from skilled PT services to modify and progress therapeutic interventions, address functional mobility deficits, address ROM deficits, address strength deficits, analyze and address soft tissue restrictions, analyze and cue movement patterns, analyze and modify body mechanics/ergonomics, assess and modify postural abnormalities and instruct in home and community integration to attain remaining goals. Progress toward goals / Updated goals: · Short Term Goals:  To be accomplished in  4  treatments:  1.  Pt will be educated in/compliant with appropriate HEP to decrease pain, increase ROM, increase strength and return pt to PLOF.  6/21/21\" goal not met, pt admits to non-compliance; 7/1/21: as per patient she is doing HEP 3x/week. 2. Pt will demonstrate Fair isometric TA for increased axial stability with ADL's.-6/15: Fair- with supervision and heavy vc's. 3. Pt will note pain less than or equal to 5/10 at worst to allow increase in functional abilities. 6/21/21: goal in progress, reports 0/10 pain over the last 3 days in her l/s; 7/1/21: average pain 5/10 and pain worst 10/10 slight regression since last assessment     · Long Term Goals: To be accomplished in  4-6  weeks:  1. Pt will improve FOTO score to >/= 60 to demo a significant improvement in functional activity tolerance. 2. Pt will increase bridge to at least 50% for to promote increased ease with stair negotiation in home. 3. Pt will demonstrate b/l SLS >/= 20\" for increased ambulation tolerance for community mobility.        PLAN  [x]  Upgrade activities as tolerated yes Continue plan of care   []  Discharge due to :    []  Other:      Therapist: Jacklyn Padilla PT    Date: 7/1/2021 Time: 12:12 PM     Future Appointments   Date Time Provider Smita Pedro   7/7/2021 11:45 AM Lina Canales PTA [facility]   7/8/2021  9:15 AM Naya Foster PT [facility]

## 2021-07-07 ENCOUNTER — HOSPITAL ENCOUNTER (OUTPATIENT)
Dept: PHYSICAL THERAPY | Age: 66
Discharge: HOME | End: 2021-07-07
Payer: MEDICARE

## 2021-07-07 PROCEDURE — 97110 THERAPEUTIC EXERCISES: CPT

## 2021-07-07 NOTE — PROGRESS NOTES
PHYSICAL THERAPY - DAILY TREATMENT NOTE    Patient Name: Crys Bustamante        Date: 2021  : 1955   yes Patient  Verified  Visit #:     Insurance: Payor: Yennifer De La Cruz / Plan: [place] MEDICARE PART A & B / Product Type: Medicare /      In time: 12:15 Out time: 12:45   Total Treatment Time: 30     Medicare/BCBS Time Tracking (below)   Total Timed Codes (min):  30 1:1 Treatment Time:  30     TREATMENT AREA =  Lower back pain [M54.5]    SUBJECTIVE  Pain Level (on 0 to 10 scale):  2  10   Medication Changes/New allergies or changes in medical history, any new surgeries or procedures? yes  If yes, update Summary List   Subjective Functional Status/Changes:  []  No changes reported   The pain in my back has been better, but I have been feeling more tightness in the right side of my mid back for some reason more than anything since I was here last.          OBJECTIVE    30 min Therapeutic Exercise:  [x]  See flow sheet   Rationale:      increase ROM and increase strength to improve the patients ability to improve functional abilities        Billed With/As:   [] TE   [] TA   [] Neuro   [] Self Care Patient Education: [x] Review HEP    [] Progressed/Changed HEP based on:   [] positioning   [] body mechanics   [] transfers   [] heat/ice application    [] other:      Other Objective/Functional Measures:       Post Treatment Pain Level (on 0 to 10) scale:   3   10     ASSESSMENT  Assessment/Changes in Function:   Pt presented with the most functional improvement with decreased intensity of pain/symptoms with ADL's, but chief c/o R lumbothoracic tension since last treatment. Will review detailed progress/goals for physician update next treatment with continuing to progress/advance within current POC/protocol as tolerated.      []  See Progress Note/Recertification   Patient will continue to benefit from skilled PT services to modify and progress therapeutic interventions, address functional mobility deficits, address ROM deficits, address strength deficits, analyze and address soft tissue restrictions, analyze and cue movement patterns, analyze and modify body mechanics/ergonomics, assess and modify postural abnormalities and instruct in home and community integration to attain remaining goals. Progress toward goals / Updated goals: · Short Term Goals: To be accomplished in  4  treatments:  1.  Pt will be educated in/compliant with appropriate HEP to decrease pain, increase ROM, increase strength and return pt to PLOF.  6/21/21\" goal not met, pt admits to non-compliance; 7/1/21: as per patient she is doing HEP 3x/week. 2. Pt will demonstrate Fair isometric TA for increased axial stability with ADL's.-6/15: Fair- with supervision and heavy vc's. 3. Pt will note pain less than or equal to 5/10 at worst to allow increase in functional abilities. 6/21/21: goal in progress, reports 0/10 pain over the last 3 days in her l/s; 7/1/21: average pain 5/10 and pain worst 10/10 slight regression since last assessment     · Long Term Goals: To be accomplished in  4-6  weeks:  1. Pt will improve FOTO score to >/= 60 to demo a significant improvement in functional activity tolerance. 2. Pt will increase bridge to at least 50% for to promote increased ease with stair negotiation in home. 3. Pt will demonstrate b/l SLS >/= 20\" for increased ambulation tolerance for community mobility.      PLAN  [x]  Upgrade activities as tolerated yes Continue plan of care   []  Discharge due to :    []  Other:      Therapist: Yarelis Pro PTA    Date: 7/7/2021 Time: 12:06 PM     Future Appointments   Date Time Provider Smita Pedro   7/8/2021  9:15 AM Larissa Correia, PT [facility]

## 2021-07-08 ENCOUNTER — HOSPITAL ENCOUNTER (OUTPATIENT)
Dept: PHYSICAL THERAPY | Age: 66
Discharge: HOME | End: 2021-07-08
Payer: MEDICARE

## 2021-07-08 PROCEDURE — 97110 THERAPEUTIC EXERCISES: CPT

## 2021-07-08 NOTE — PROGRESS NOTES
[facility] PHYSICAL THERAPY  [address], [place], [address] - Phone: [phone]  Fax: [phone]  DISCHARGE SUMMARY FOR PHYSICAL THERAPY          Patient Name: Violette Sutton : 1955   Treatment/Medical Diagnosis: Lower back pain [M54.5]   Onset Date: ~1 year  S/p L1 kyphoplasty 21    Referral Source: Hanny Conrteras MD [facility]): 21   Prior Hospitalization: See Medical History Provider #: [id number]   Prior Level of Function: prior to onset no limitations to ADL's/mobility. For the past year, pt has been unable to perform bed mobility/rolling, LE's buckle when getting OOB. 2 story home with 1st floor set up. Lives with spouse   Comorbidities: Arthritis, HTN   Medications: Verified on Patient Summary List   Visits from [facility]: 8 Missed Visits: 0     Goal/Measure of Progress Goal Met? 1. Pt will be educated in/compliant with appropriate HEP to decrease pain, increase ROM, increase strength and return pt to PLOF.      Status at last Eval: Issued and non-compliant Current Status: Issued finalized HEP and reviewed with pt Partially met; pt ed however inconsistent with performance   2. Pt will demonstrate Fair isometric TA for increased axial stability with ADL's. Status at last Eval: Poor Current Status: Good- yes   3. Pt will note pain less than or equal to 5/10 at worst to allow increase in functional abilities.    Status at last Eval: 10/10 Current Status: 2-3/10 for the past week yes     4. Pt will improve FOTO score to >/= 60 to demo a significant improvement in functional activity tolerance. Status at last Eval: 51 Current Status: 67 yes   5. Pt will increase bridge to at least 50% for to promote increased ease with stair negotiation in home. Status at last Eval: <25% with c/o \"sore\" low back. Current Status: 25% progressing   6.   Pt will demonstrate b/l SLS >/= 20\" for increased ambulation tolerance for community mobility. Status at last Eval: R 9\", L 10\" Current Status: R 28\", L 14\" Met for R,   Not Met for L     [place] Functional Changes/Progress: Pt reports 100% overall improvement in symptoms since beginning care. Pt reports 2-3/10 max pain, 2/10 avg pain. Improvements: \"I can walk farther. I can travel now without pain. I'm sleeping better. My biggest issue was trying to turn in bed and I can flip myself over no problem. \" Unlimited tolerance for sitting (SOC 1 hour), unlimited standing/walking tolerance (SOC limited to 30 min). Remaining functional limitations: transfer off a high bed (cautious), stairs (goes slowly reciprocally)    Objective measurements:  LUMBAR ROM: WNL all planes and painfree except L SB/ROT ~80% with end range pain c/o to contralat side  FOTO 67/100    Assessments/Recommendations: Discontinue therapy. Progressing towards or have reached established goals. Pt has been educated on appropriate long term HEP and emphasized on need for compliance to maintain/progress current function and strength. If you have any questions/comments please contact us directly at [phone]. Thank you for allowing us to assist in the care of your patient. Therapist Signature: Rip Frankel.  GAL Jurado Date: 07/08/21    Reporting Period: 06/08/21 to 07/08/21  Time: 9:23 AM

## 2021-07-08 NOTE — PROGRESS NOTES
Physical Therapy Discharge Instructions  [facility]  [facility]  [address], [place] [address]  P: [phone] F: [phone]    Patient: Facundo Flow  : 1955    Reason for Discharge From PT:  [x]Met/progressing towards all set goals    []Minimal progress made towards set goals        Recommendations:   [x] Return to activity with home program as prescribed on print-outs    - Return to activity with the following modifications: No heavy lifting. Stop activities which increase pain         Continue with      [] Ice [] Heat   as needed for 10-15 minutes to relieve pain  *If pain does not improve after several days, follow-up with your physician for a consult*           Follow up with MD:     [] Upon completion of therapy   [x] As needed      Additional Comments: Thank you for choosing [facility] [place] for your PT needs! Araceli Jurado, PT 2021 9:40 AM

## 2021-07-08 NOTE — PROGRESS NOTES
PHYSICAL THERAPY - DAILY TREATMENT NOTE    Patient Name: Noe Marquez        Date: 2021  : 1955   yes Patient  Verified  Visit #:   8     Insurance: Payor: [employer] / Plan: [place] MEDICARE PART A & B / Product Type: Medicare /      In time: 9:00 Out time: 9:57   Total Treatment Time: 53     Medicare/BCBS Time Tracking (below)   Total Timed Codes (min):  53 1:1 Treatment Time:  53     TREATMENT AREA =  Lower back pain [M54.5]    SUBJECTIVE  Pain Level (on 0 to 10 scale):  2  / 10   Medication Changes/New allergies or changes in medical history, any new surgeries or procedures? yes  If yes, update Summary List   Subjective Functional Status/Changes:  []  No changes reported   See d/c       OBJECTIVE    53 min Therapeutic Exercise:  [x]  See flow sheet   (-4 min bike)   Rationale:      increase ROM and increase strength to improve the patients ability to improve functional abilities        Billed With/As:   [x] TE   [] TA   [] Neuro   [] Self Care Patient Education: [x] Review HEP    [] Progressed/Changed HEP based on:   [] positioning   [] body mechanics   [] transfers   [] heat/ice application    [] other:      Other Objective/Functional Measures:    Demo and vc's for PPT on SB; 1:1 supervision    -issued finalized HEP; reviwed and ed pt reports understanding     Post Treatment Pain Level (on 0 to 10) scale:   0   / 10     ASSESSMENT  Assessment/Changes in Function:   See d/c           Progress toward goals / Updated goals:  See d/c       PLAN  [x]  Upgrade activities as tolerated no Continue plan of care   []  Discharge due to :    []  Other:      Therapist: Josefina Wallis.  Lubna Sandoval, PT    Date: 2021 Time: 9:58 AM      Future Appointments   Date Time Provider Smita Pedro   2021  9:15 AM Darrell Conteh, PT [facility]

## 2022-08-17 NOTE — PROGRESS NOTES
PT DAILY TREATMENT NOTE     Patient Name: Amalia Pereyra  Date:3/19/2018  : 1955  [x]  Patient  Verified  Payor:  / Plan: [facility] / Product Type:  /    In time:3:04  Out time:4:00  Total Treatment Time (min): 56  Total Timed Codes (min): 46  1:1 Treatment Time (min): 46   Visit #: 2 of -12    Treatment Area: Right shoulder pain [M25.511]    SUBJECTIVE  Pain Level (0-10 scale): 6  Any medication changes, allergies to medications, adverse drug reactions, diagnosis change, or new procedure performed?: [x] No    [] Yes (see summary sheet for update)  Subjective functional status/changes:   [] No changes reported  Pt reports awaking with increased left thoracic/scapular pain    OBJECTIVE  Modality rationale: decrease pain and increase tissue extensibility to improve the patients ability to change upper limb position for lifting, reaching and dressing tasks   Min Type Additional Details    [] Estim: []Att   []Unatt        []TENS instruct                  []IFC  []Premod   []NMES                     []Other:  []w/US   []w/ice   []w/heat  Position:  Location:    []  Traction: [] Cervical       []Lumbar                       [] Prone          []Supine                       []Intermittent   []Continuous Lbs:  [] before manual  [] after manual    []  Ultrasound: []Continuous   [] Pulsed                           []1MHz   []3MHz Location:  W/cm2:    []  Iontophoresis with dexamethasone         Location: [] Take home patch   [] In clinic    []  Ice     []  heat  []  Ice massage Position:  Location:    []  Vasopneumatic Device Pressure:       [] lo [] med [] hi   Temperature: [] lo [] med [] hi   [] Skin assessment post-treatment:  []intact []redness- no adverse reaction       []redness - adverse reaction:       36 min Therapeutic Exercise:  [] See flow sheet :   Rationale: increase ROM and increase strength to improve the patients ability to change upper limb position for lifting, reaching and dressing tasks    10 min Manual Therapy:  Instrument assisted soft tissue mobilization applied to right lat/rhomboids using GT-1, GT-5, GT-3 instruments   Rationale: increase tissue extensibility to improve the patient's ability to change upper limb position for lifting, reaching and dressing tasks    Pain Level (0-10 scale) post treatment: 3    ASSESSMENT/Changes in Function: Patient presented with more widespread R thoracic/scapular/lat pain and some symptoms into the rib cage. This was addressed both with manual therapy and therex activities, and patient reported a drop from 6/10 to 3/10 pain post treatment. Patient will continue to benefit from skilled PT services to modify and progress therapeutic interventions, address strength deficits and analyze and cue movement patterns to attain remaining goals.      []  See Plan of Care  []  See progress note/recertification  []  See Discharge Summary         Progress towards goals / Updated goals:  STG #2: Patient will be educated on sitting posture modification to reduce musculoskeletal strain with sitting ADL's- Met    PLAN  []  Upgrade activities as tolerated     [x]  Continue plan of care  []  Update interventions per flow sheet       []  Discharge due to:_  []  Other:_      Leslie Goldberg, PT 3/19/2018  12:47 PM [employer]